# Patient Record
Sex: FEMALE | Race: WHITE | NOT HISPANIC OR LATINO | URBAN - METROPOLITAN AREA
[De-identification: names, ages, dates, MRNs, and addresses within clinical notes are randomized per-mention and may not be internally consistent; named-entity substitution may affect disease eponyms.]

---

## 2020-04-28 ENCOUNTER — APPOINTMENT (RX ONLY)
Dept: URBAN - METROPOLITAN AREA CLINIC 158 | Facility: CLINIC | Age: 75
Setting detail: DERMATOLOGY
End: 2020-04-28

## 2020-04-28 DIAGNOSIS — L40.0 PSORIASIS VULGARIS: ICD-10-CM | Status: INADEQUATELY CONTROLLED

## 2020-04-28 PROCEDURE — ? COUNSELING

## 2020-04-28 PROCEDURE — 99202 OFFICE O/P NEW SF 15 MIN: CPT

## 2020-04-28 PROCEDURE — ? PATIENT SPECIFIC COUNSELING

## 2020-04-28 PROCEDURE — ? PRESCRIPTION

## 2020-04-28 RX ORDER — BETAMETHASONE DIPROPIONATE 0.5 MG/G
1 CREAM TOPICAL TWICE A DAY
Qty: 1 | Refills: 2 | Status: ERX | COMMUNITY
Start: 2020-04-28

## 2020-04-28 RX ADMIN — BETAMETHASONE DIPROPIONATE 1: 0.5 CREAM TOPICAL at 00:00

## 2020-04-28 ASSESSMENT — LOCATION SIMPLE DESCRIPTION DERM
LOCATION SIMPLE: RIGHT FOOT
LOCATION SIMPLE: LEFT FOOT

## 2020-04-28 ASSESSMENT — LOCATION ZONE DERM: LOCATION ZONE: FEET

## 2020-04-28 ASSESSMENT — LOCATION DETAILED DESCRIPTION DERM
LOCATION DETAILED: RIGHT DORSAL FOOT
LOCATION DETAILED: LEFT DORSAL FOOT

## 2020-04-28 NOTE — PROCEDURE: PATIENT SPECIFIC COUNSELING
Detail Level: Detailed
Patient has a history of psoriasis on the feet which was previously treated here at [facility] by Dr. España with various topical medications including clobetasol ointment, Clobex spray, Vectical ointment, Dovonex cream, Ultravate ointment, and Tazorac gel.  Condition has recently flared over the past few months and has been unresponsive to Neosporin and OTC psoriasis cream.  Differential diagnosis includes dyshidrotic eczema and ACD. I will start her on betamethasone diproprionate cream TID.  I told her to discontinue all other topicals. Return to clinic in 1 month.

## 2020-05-26 ENCOUNTER — APPOINTMENT (RX ONLY)
Dept: URBAN - METROPOLITAN AREA CLINIC 158 | Facility: CLINIC | Age: 75
Setting detail: DERMATOLOGY
End: 2020-05-26

## 2020-05-26 DIAGNOSIS — L40.0 PSORIASIS VULGARIS: ICD-10-CM

## 2020-05-26 DIAGNOSIS — L60.8 OTHER NAIL DISORDERS: ICD-10-CM

## 2020-05-26 PROCEDURE — 99213 OFFICE O/P EST LOW 20 MIN: CPT

## 2020-05-26 PROCEDURE — ? PATIENT SPECIFIC COUNSELING

## 2020-05-26 PROCEDURE — ? PRESCRIPTION

## 2020-05-26 PROCEDURE — ? COUNSELING

## 2020-05-26 RX ORDER — BETAMETHASONE DIPROPIONATE 0.5 MG/G
1 CREAM TOPICAL TWICE A DAY
Qty: 1 | Refills: 3

## 2020-05-26 ASSESSMENT — LOCATION DETAILED DESCRIPTION DERM
LOCATION DETAILED: RIGHT THUMBNAIL
LOCATION DETAILED: LEFT DORSAL FOOT
LOCATION DETAILED: RIGHT DORSAL FOOT

## 2020-05-26 ASSESSMENT — LOCATION ZONE DERM
LOCATION ZONE: FINGERNAIL
LOCATION ZONE: FEET

## 2020-05-26 ASSESSMENT — LOCATION SIMPLE DESCRIPTION DERM
LOCATION SIMPLE: RIGHT FOOT
LOCATION SIMPLE: LEFT FOOT
LOCATION SIMPLE: RIGHT THUMBNAIL

## 2020-05-26 NOTE — PROCEDURE: PATIENT SPECIFIC COUNSELING
Detail Level: Detailed
Carry forward from 4/28/2020: Patient has a history of psoriasis on the feet which was previously treated here at [facility] by Dr. España with various topical medications including clobetasol ointment, Clobex spray, Vectical ointment, Dovonex cream, Ultravate ointment, and Tazorac gel.  Condition has recently flared over the past few months and has been unresponsive to Neosporin and OTC psoriasis cream.  Differential diagnosis includes dyshidrotic eczema and ACD. I will start her on betamethasone diproprionate cream TID.  I told her to discontinue all other topicals. Return to clinic in 1 month.\\n\\nTODAY's note (May 26 2020): Psoriasis is much improved since last visit within two weeks of using betamethasone diproprionate cream.  Patient request refills.  Printed prescription given as she states the prescription was sent to incorrect pharmacy.  She can use the medication as needed for flares.
Detail Level: Simple
Told patient that it may take 6-9 months for this thumbnail to grow out.